# Patient Record
(demographics unavailable — no encounter records)

---

## 2025-04-29 NOTE — HISTORY OF PRESENT ILLNESS
[FreeTextEntry1] : 45-year-old woman here for CPE She does not come fasting [de-identified] : No specific complaints.

## 2025-04-29 NOTE — ASSESSMENT
[Vaccines Reviewed] : Immunizations reviewed today. Please see immunization details in the vaccine log within the immunization flowsheet.  [FreeTextEntry1] : CPE-will check to see if patient is up-to-date on health maintenance and vaccinations.  She likely received Tdap vaccination with her last pregnancy in 2017.  We discussed screening colonoscopy and I will refer her for one.  History of renal stones- check urinalysis. Referred to urology as needed.   Impaired fasting glucose- she has 2 glucoses in her chart both of which are somewhat elevated although I suspect they were not fasting. She is not fasting today but will include an A1c.   Elevated MCV- I suspect this is alcohol related. Recheck today.  Declines HIV testing

## 2025-04-29 NOTE — HEALTH RISK ASSESSMENT
[Yes] : Yes [4 or more  times a week (4 pts)] : 4 or more  times a week (4 points) [3 or 4 (1 pt)] : 3 or 4  (1 point) [Never (0 pts)] : Never (0 points) [No] : In the past 12 months have you used drugs other than those required for medical reasons? No [Audit-CScore] : 5 [Never] : Never [HIV test declined] : HIV test declined [HIVDate] : 04/25

## 2025-04-29 NOTE — PHYSICAL EXAM
[No Acute Distress] : no acute distress [Well Nourished] : well nourished [Well Developed] : well developed [Well-Appearing] : well-appearing [Normal Sclera/Conjunctiva] : normal sclera/conjunctiva [PERRL] : pupils equal round and reactive to light [EOMI] : extraocular movements intact [Normal Outer Ear/Nose] : the outer ears and nose were normal in appearance [Normal Oropharynx] : the oropharynx was normal [No JVD] : no jugular venous distention [No Lymphadenopathy] : no lymphadenopathy [Supple] : supple [Thyroid Normal, No Nodules] : the thyroid was normal and there were no nodules present [No Respiratory Distress] : no respiratory distress  [No Accessory Muscle Use] : no accessory muscle use [Clear to Auscultation] : lungs were clear to auscultation bilaterally [Normal Rate] : normal rate  [Regular Rhythm] : with a regular rhythm [Normal S1, S2] : normal S1 and S2 [No Murmur] : no murmur heard [No Carotid Bruits] : no carotid bruits [No Abdominal Bruit] : a ~M bruit was not heard ~T in the abdomen [No Varicosities] : no varicosities [Pedal Pulses Present] : the pedal pulses are present [No Edema] : there was no peripheral edema [No Palpable Aorta] : no palpable aorta [No Extremity Clubbing/Cyanosis] : no extremity clubbing/cyanosis [Soft] : abdomen soft [Non Tender] : non-tender [Non-distended] : non-distended [No Masses] : no abdominal mass palpated [No HSM] : no HSM [Normal Bowel Sounds] : normal bowel sounds [Normal Posterior Cervical Nodes] : no posterior cervical lymphadenopathy [Normal Anterior Cervical Nodes] : no anterior cervical lymphadenopathy [No CVA Tenderness] : no CVA  tenderness [No Spinal Tenderness] : no spinal tenderness [No Joint Swelling] : no joint swelling [Grossly Normal Strength/Tone] : grossly normal strength/tone [No Rash] : no rash [Coordination Grossly Intact] : coordination grossly intact [No Focal Deficits] : no focal deficits [Normal Gait] : normal gait [Deep Tendon Reflexes (DTR)] : deep tendon reflexes were 2+ and symmetric [Normal Affect] : the affect was normal [Normal Insight/Judgement] : insight and judgment were intact [de-identified] : + Large T.  [place]gu[place]oi [de-identified] : Trace DTRs

## 2025-04-29 NOTE — REVIEW OF SYSTEMS
[Negative] : Respiratory [Headache] : headache [Fever] : no fever [Chills] : no chills [Vision Problems] : no vision problems [Hearing Loss] : no hearing loss [Chest Pain] : no chest pain [Palpitations] : no palpitations [Abdominal Pain] : no abdominal pain [Nausea] : no nausea [Vomiting] : no vomiting [Heartburn] : no heartburn [Dysuria] : no dysuria [Hematuria] : no hematuria [Skin Rash] : no skin rash [Dizziness] : no dizziness [Confusion] : no confusion [Anxiety] : no anxiety [Depression] : no depression [Easy Bleeding] : no easy bleeding [FreeTextEntry3] : Last eye exam many years ago [FreeTextEntry8] : No renal stones or disease

## 2025-06-20 NOTE — HISTORY OF PRESENT ILLNESS
[FreeTextEntry1] :  Ms. JEANNETTE MURCIA is a 45 year old female PMH of nephrolithiasis   Patient presents for pre-colonoscopy evaluation. First colonoscopy. Prior CRC screening such as Cologuard, FIT test, CT colonography: No   Denies change in bowel habits, constipation, N/V/D, rectal bleeding, abdominal pain, bloating, unintentional weight loss, early satiety, dysphagia. Denies heartburn or reflux on a regular basis. Denies hematochezia or melena. BM every 1-2 days; soft, brown stools that are easily passed.   Family history: Denies family history of colon cancer or advanced colorectal polyps. Denies family history of IBD or celiac disease.   Social: Smoking history: Never Alcohol consumption: Occasional Marijuana use: None Other recreational drug use: None

## 2025-06-20 NOTE — ASSESSMENT
[FreeTextEntry1] :   Colonoscopy with Dr. Espinoza - Indications: screening - Reviewed importance of adequate colon cleansing prior to colonoscopy. Instructed to contact office if he/she has any questions regarding prep. - Explained the risks (including but not limited to cardiopulmonary anesthetic complications, bleeding, perforation, aspiration, missed lesions and misidentified sites of lesions - complications that might necessitate hospitalization or surgery), benefits and alternatives of colonoscopy were reviewed with the patient. Preparation for the procedure was reviewed with the patient. The patient was informed that she/he would be given intravenous anesthesia by an anesthesiologist for the procedure. The patient was informed that a family member or friend must drive the patient following recovery from the procedure. Patient understands and agrees, all questions answered. - Holds: none - Prep: suprep

## 2025-07-28 NOTE — HISTORY OF PRESENT ILLNESS
[FreeTextEntry8] : 45-year-old woman who felt a "pop" right calf in her 1 week ago while running. No bruising noted and she has not noticed any weakness in the leg. Still remains painful and tender.

## 2025-07-28 NOTE — PLAN
[FreeTextEntry1] : Orthopedic consult to rule out partial Achilles tear versus strain Return here as needed

## 2025-07-28 NOTE — ASSESSMENT
[FreeTextEntry1] : Right calf pain- she does have continued pain and some weakness raising concern of a partial Achilles tear. I discussed this in detail and recommended she avoid strenuous activity until she is evaluated by an orthopedist. I do not think she will require surgical intervention but more likely physical therapy.  Orthopedic referral made today to Dr. Eli or other orthopedist.

## 2025-07-28 NOTE — PHYSICAL EXAM
[Normal] : no acute distress, well nourished, well developed and well-appearing [de-identified] : + Tenderness at edge of upper Achilles right leg; some weakness in heel lift right leg